# Patient Record
Sex: FEMALE | Race: WHITE
[De-identification: names, ages, dates, MRNs, and addresses within clinical notes are randomized per-mention and may not be internally consistent; named-entity substitution may affect disease eponyms.]

---

## 2017-12-08 ENCOUNTER — HOSPITAL ENCOUNTER (EMERGENCY)
Dept: HOSPITAL 76 - ED | Age: 82
Discharge: HOME | End: 2017-12-08
Payer: MEDICARE

## 2017-12-08 VITALS — DIASTOLIC BLOOD PRESSURE: 94 MMHG | SYSTOLIC BLOOD PRESSURE: 121 MMHG

## 2017-12-08 DIAGNOSIS — S42.294A: Primary | ICD-10-CM

## 2017-12-08 DIAGNOSIS — Z79.82: ICD-10-CM
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
## 2017-12-08 DIAGNOSIS — Z87.891: ICD-10-CM

## 2017-12-08 PROCEDURE — 99284 EMERGENCY DEPT VISIT MOD MDM: CPT

## 2017-12-08 PROCEDURE — 99283 EMERGENCY DEPT VISIT LOW MDM: CPT

## 2017-12-08 PROCEDURE — 73060 X-RAY EXAM OF HUMERUS: CPT

## 2017-12-08 NOTE — XRAY REPORT
EXAM:

RIGHT HUMERUS RADIOGRAPHY

 

EXAM DATE: 12/8/2017 05:51 PM.

 

CLINICAL HISTORY: Fell, pain.

 

COMPARISON: None.

 

TECHNIQUE: 4 views.

 

FINDINGS: 

Bones: Acute comminuted nondisplaced fracture involving the right humeral head and neck with maximum 
3 mm distraction of the fracture fragments, without angulation.

 

Joints: Mild inferior subluxation of the right humeral head relative to the glenoid fossa consistent 
with a presumed large right hemarthrosis.

Grossly normal right elbow.

 

Soft Tissues: Edema at the fracture site. Prior right breast surgery.

 

IMPRESSION: 

Right humeral head and neck fracture.

 

[facility]

Referring Provider Line: [phone]

 

SITE ID: [id number]

## 2017-12-08 NOTE — XRAY PRELIMINARY REPORT
Accession: [id number]

Exam: XR HUMERUS RT

 

IMPRESSION: Right humeral head and neck fracture.

 

[facility]

 

SITE ID: [id number]

## 2017-12-08 NOTE — ED PHYSICIAN DOCUMENTATION
PD HPI UPPER EXT INJURY





- Stated complaint


Stated Complaint: R SHOULDER PX/GLF





- Chief complaint


Chief Complaint: Ext Problem





- History obtained from


History obtained from: Patient, Family





- History of Present Illness


Location: Other (85-year-old woman visiting from [place], she will be here 

for about 2 months.  This morning getting on the before she got on the plane 

she fell and injured her right shoulder.  It was an isolated injury.  She 

actually flew up here from [place] anyway where she will be visiting for 2 

months.)





Review of Systems


Constitutional: denies: Fever, Chills


Musculoskeletal: denies: Neck pain, Back pain, Pain with weight bearing


Neurologic: denies: Headache, Head injury, LOC





PD PAST MEDICAL HISTORY





- Past Medical History


Past Medical History: Yes





- Past Surgical History


Past Surgical History: Yes


Ortho: Knee replacement


/GYN: Other





- Present Medications


Home Medications: 


 Ambulatory Orders











 Medication  Instructions  Recorded  Confirmed


 


Anastrozole 5 mg PO DAILY 12/08/17 12/08/17


 


Aspirin 315 mg PO DAILY 12/08/17 12/08/17


 


Furosemide [Lasix] 40 mg PO DAILY 12/08/17 12/08/17


 


Levothyroxine [Synthroid] 50 mcg PO DAILY 12/08/17 12/08/17


 


Metoprolol Succinate 50 mg PO DAILY 12/08/17 12/08/17


 


Oxycodone HCl/Acetaminophen 1 - 2 tab PO Q4H PRN #15 tablet 12/08/17 





[Percocet 5-325 mg Tablet]   


 


Paroxetine HCl [Paxil] 20 mg PO DAILY 12/08/17 12/08/17


 


Potassium Chloride 40 meq PO DAILY 12/08/17 12/08/17














- Allergies


Allergies/Adverse Reactions: 


 Allergies











Allergy/AdvReac Type Severity Reaction Status Date / Time


 


No Known Drug Allergies Allergy   Verified 12/08/17 17:19














- Social History


Does the pt smoke?: No


Smoking Status: Former smoker


Does the pt drink ETOH?: Yes


Does the pt have substance abuse?: No





- Immunizations


Immunizations are current?: Yes





PD ED PE NORMAL





- Vitals


Vital signs reviewed: Yes





- General


General: Alert and oriented X 3, No acute distress





- HEENT


HEENT: PERRL, EOMI





- Neck


Neck: Supple, no meningeal sign, No bony TTP





- Extremities


Extremities: Other (Tender to the proximal humerus without deformity.  Mild 

tenderness to the elbow, good radial pulse and sensation throughout the hand, 

the remainder of the extremities are nontender.)





- Neuro


Neuro: Alert and oriented X 3, Normal speech





- Psych


Psych: Normal mood, Normal affect





Results





- Vitals


Vitals: 


 Vital Signs - 24 hr











  12/08/17 12/08/17





  17:14 18:20


 


Temperature 36.3 C L 36.4 C L


 


Heart Rate 78 82


 


Respiratory 19 





Rate  


 


Blood Pressure 132/79 H 121/94 H


 


O2 Saturation 97 








 Oxygen











O2 Source                      Room air

















- Rads (name of study)


  ** R humerus


Radiology: EMP read contemporaneously (Right humeral head and neck fracture 

with a large joint effusion)





PD MEDICAL DECISION MAKING





- ED course


ED course: 





She already had a sling from a clavicle injury before.





Departure





- Departure


Disposition: 01 Home, Self Care


Clinical Impression: 


Closed fracture of right proximal humerus


Qualifiers:


 Encounter type: initial encounter Fracture morphology: other fracture Fracture 

alignment: nondisplaced Qualified Code(s): S42.294A - Other nondisplaced 

fracture of upper end of right humerus, initial encounter for closed fracture





Condition: Good


Record reviewed to determine appropriate education?: Yes


Instructions:  ED Fx Upper Ext


Follow-Up: 


[facility] [Provider Group]


Prescriptions: 


Oxycodone HCl/Acetaminophen [Percocet 5-325 mg Tablet] 1 - 2 tab PO Q4H PRN #15 

tablet


 PRN Reason: Pain


Comments: 


Call the orthopedics Monday office on Monday for an appointment.  Return if 

worse.  Keep it in the sling until you follow-up.





Do not drink or drive while taking narcotic pain medication.


Note that many narcotic pain relievers also contain Tylenol/acetaminophen.  

Please ensure that your total dose of acetaminophen from all sources does not 

exceed 3 g (3000 mg) per day.


You may get constipated while on this medication.  Take a stool softener such 

as Colace twice a day while you are on it.  Also add an over-the-counter 

laxative such as senna or MiraLAX on any day that you do not have a bowel 

movement.


If you received a narcotic pain medication or sedative while in the emergency 

department, do not drive for the next 24 hours.





Your blood pressure was elevated today on check into the emergency department.  

This does not mean that you have hypertension, it is a common phenomenon to 

come to the emergency department and have elevated blood pressure.  I recommend 

that you see your primary care physician within the week to have it rechecked 

when you are feeling better.


Discharge Date/Time: 12/08/17 18:20

## 2017-12-12 ENCOUNTER — HOSPITAL ENCOUNTER (EMERGENCY)
Dept: HOSPITAL 76 - ED | Age: 82
Discharge: HOME | End: 2017-12-12
Payer: MEDICARE

## 2017-12-12 VITALS — DIASTOLIC BLOOD PRESSURE: 82 MMHG | SYSTOLIC BLOOD PRESSURE: 147 MMHG

## 2017-12-12 DIAGNOSIS — Z79.82: ICD-10-CM
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
## 2017-12-12 DIAGNOSIS — Z96.659: ICD-10-CM

## 2017-12-12 DIAGNOSIS — S42.301A: Primary | ICD-10-CM

## 2017-12-12 PROCEDURE — 99283 EMERGENCY DEPT VISIT LOW MDM: CPT

## 2017-12-12 NOTE — ED PHYSICIAN DOCUMENTATION
History of Present Illness





- Stated complaint


Stated Complaint: RT SHOULDER PX





- Chief complaint


Chief Complaint: Ext Problem





- History obtained from


History obtained from: Patient (pt was here on friday after a fall and was dx 

with a right humerus fracture.  was given 15 percocet. she was to have a ortho 

follow up today but it had to be canceled because the doctor was sick.  she has 

runout of her pain meds.  She has a follow up tomorrow withthe ortho provider.)





Review of Systems


Constitutional: denies: Fever


Cardiac: denies: Chest pain / pressure


Respiratory: denies: Dyspnea


GI: denies: Constipation


Skin: reports: Other (bruising to the right shoulder)


Musculoskeletal: reports: Other (pain to the right shoulder)





PD PAST MEDICAL HISTORY





- Past Surgical History


Past Surgical History: Yes


Ortho: Knee replacement


/GYN: Other





- Present Medications


Home Medications: 


 Ambulatory Orders











 Medication  Instructions  Recorded  Confirmed


 


Anastrozole 5 mg PO DAILY 12/08/17 12/12/17


 


Aspirin 315 mg PO DAILY 12/08/17 12/12/17


 


Furosemide [Lasix] 40 mg PO DAILY 12/08/17 12/12/17


 


Levothyroxine [Synthroid] 50 mcg PO DAILY 12/08/17 12/12/17


 


Metoprolol Succinate 50 mg PO DAILY 12/08/17 12/12/17


 


Oxycodone HCl/Acetaminophen 1 - 2 tab PO Q4H PRN #15 tablet 12/08/17 12/12/17





[Percocet 5-325 mg Tablet]   


 


Paroxetine HCl [Paxil] 20 mg PO DAILY 12/08/17 12/12/17


 


Potassium Chloride 40 meq PO DAILY 12/08/17 12/12/17


 


oxyCODONE/ACET 5/325 [Percocet 5 1 - 2 each PO Q6H PRN #30 tablet 12/12/17 





mg/325 mg]   














- Allergies


Allergies/Adverse Reactions: 


 Allergies











Allergy/AdvReac Type Severity Reaction Status Date / Time


 


No Known Drug Allergies Allergy   Verified 12/08/17 17:19














- Social History


Does the pt smoke?: No


Smoking Status: Never smoker


Does the pt drink ETOH?: Yes


Does the pt have substance abuse?: No





- Immunizations


Immunizations are current?: Yes





PD ED PE NORMAL





- Vitals


Vital signs reviewed: Yes





- General


General: Alert and oriented X 3, Well developed/nourished





- Cardiac


Cardiac: Strong equal pulses (radial )





- Derm


Derm: Other (brusisin to the right upper humerus.  )





- Extremities


Extremities: Other (pt in a sling and TTP and decreased movement to the right 

shoulder)





- Neuro


Neuro: Other (sensation intact to the right UE)





- Psych


Psych: Normal affect





Results





- Vitals


Vitals: 





 Vital Signs - 24 hr











  12/12/17





  13:22


 


Temperature 36.5 C


 


Heart Rate 87


 


Respiratory 20





Rate 


 


Blood Pressure 147/82 H


 


O2 Saturation 95








 Oxygen











O2 Source                      Room air

















PD MEDICAL DECISION MAKING





- ED course


Complexity details: d/w patient


ED course: 





Pt with known right shoulder injury and is out of her pain meds.  she has a 

follow up with ortho tomorrow.  is here only for a refill of her medications.  

She has no constipation problems,.  





Departure





- Departure


Disposition: 01 Home, Self Care


Clinical Impression: 


 Humeral fracture





Condition: Good


Follow-Up: 


ortho, tomorrow [Other]


Prescriptions: 


oxyCODONE/ACET 5/325 [Percocet 5 mg/325 mg] 1 - 2 each PO Q6H PRN #30 tablet


 PRN Reason: Pain


Comments: 


keep your appointment for tomorrow.  Return to the ER for any new or worsening 

symptoms.

## 2017-12-19 ENCOUNTER — HOSPITAL ENCOUNTER (EMERGENCY)
Dept: HOSPITAL 76 - ED | Age: 82
Discharge: HOME | End: 2017-12-19
Payer: MEDICARE

## 2017-12-19 VITALS — SYSTOLIC BLOOD PRESSURE: 123 MMHG | DIASTOLIC BLOOD PRESSURE: 62 MMHG

## 2017-12-19 DIAGNOSIS — Z86.711: ICD-10-CM

## 2017-12-19 DIAGNOSIS — S32.040A: Primary | ICD-10-CM

## 2017-12-19 DIAGNOSIS — D64.9: ICD-10-CM

## 2017-12-19 DIAGNOSIS — E87.6: ICD-10-CM

## 2017-12-19 DIAGNOSIS — Z79.82: ICD-10-CM

## 2017-12-19 DIAGNOSIS — I48.91: ICD-10-CM

## 2017-12-19 DIAGNOSIS — Z85.3: ICD-10-CM

## 2017-12-19 DIAGNOSIS — R60.9: ICD-10-CM
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
## 2017-12-19 LAB
ANION GAP SERPL CALCULATED.4IONS-SCNC: 14 MMOL/L (ref 6–13)
BASOPHILS NFR BLD AUTO: 0.1 10^3/UL (ref 0–0.1)
BASOPHILS NFR BLD AUTO: 1.2 %
BUN SERPL-MCNC: 23 MG/DL (ref 6–20)
CALCIUM UR-MCNC: 9.6 MG/DL (ref 8.5–10.3)
CHLORIDE SERPL-SCNC: 89 MMOL/L (ref 101–111)
CO2 SERPL-SCNC: 34 MMOL/L (ref 21–32)
CREAT SERPLBLD-SCNC: 0.9 MG/DL (ref 0.4–1)
CUL URINE ADD CHARGE: (no result)
EOSINOPHIL # BLD AUTO: 0.4 10^3/UL (ref 0–0.7)
EOSINOPHIL NFR BLD AUTO: 4.5 %
ERYTHROCYTE [DISTWIDTH] IN BLOOD BY AUTOMATED COUNT: 13.9 % (ref 12–15)
GFRSERPLBLD MDRD-ARVRAT: 60 ML/MIN/{1.73_M2} (ref 89–?)
GLUCOSE SERPL-MCNC: 141 MG/DL (ref 70–100)
HCT VFR BLD AUTO: 33.4 % (ref 37–47)
HGB UR QL STRIP: 11.3 G/DL (ref 12–16)
LYMPHOCYTES # SPEC AUTO: 0.6 10^3/UL (ref 1.5–3.5)
LYMPHOCYTES NFR BLD AUTO: 7.8 %
MCH RBC QN AUTO: 28.3 PG (ref 27–31)
MCHC RBC AUTO-ENTMCNC: 33.9 G/DL (ref 32–36)
MCV RBC AUTO: 83.4 FL (ref 81–99)
MONOCYTES # BLD AUTO: 0.5 10^3/UL (ref 0–1)
MONOCYTES NFR BLD AUTO: 5.6 %
NEUTROPHILS # BLD AUTO: 6.8 10^3/UL (ref 1.5–6.6)
NEUTROPHILS # SNV AUTO: 8.4 X10^3/UL (ref 4.8–10.8)
NEUTROPHILS NFR BLD AUTO: 80.9 %
NRBC # BLD AUTO: 0 /100WBC
PDW BLD AUTO: 6.9 FL (ref 7.9–10.8)
PH UR STRIP.AUTO: 7 PH (ref 5–7.5)
POTASSIUM SERPL-SCNC: 3 MMOL/L (ref 3.5–5)
RBC MAR: 4.01 10^6/UL (ref 4.2–5.4)
SODIUM SERPLBLD-SCNC: 137 MMOL/L (ref 135–145)
SP GR UR STRIP.AUTO: 1.01 (ref 1–1.03)
UA CHARGE (STRIP ONLY): (no result)
UA W/ MICROSCOPIC CHARGE: YES
UR CULTURE IF IND: (no result)
UROBILINOGEN UR STRIP.AUTO-MCNC: NEGATIVE MG/DL
WBC # BLD: 8.4 X10^3/UL
WBC # UR MANUAL: (no result) /HPF (ref 0–5)

## 2017-12-19 PROCEDURE — 72100 X-RAY EXAM L-S SPINE 2/3 VWS: CPT

## 2017-12-19 PROCEDURE — 99283 EMERGENCY DEPT VISIT LOW MDM: CPT

## 2017-12-19 PROCEDURE — 85025 COMPLETE CBC W/AUTO DIFF WBC: CPT

## 2017-12-19 PROCEDURE — 83880 ASSAY OF NATRIURETIC PEPTIDE: CPT

## 2017-12-19 PROCEDURE — 84484 ASSAY OF TROPONIN QUANT: CPT

## 2017-12-19 PROCEDURE — 81001 URINALYSIS AUTO W/SCOPE: CPT

## 2017-12-19 PROCEDURE — 71010: CPT

## 2017-12-19 PROCEDURE — 72170 X-RAY EXAM OF PELVIS: CPT

## 2017-12-19 PROCEDURE — 80048 BASIC METABOLIC PNL TOTAL CA: CPT

## 2017-12-19 PROCEDURE — 81003 URINALYSIS AUTO W/O SCOPE: CPT

## 2017-12-19 PROCEDURE — 93970 EXTREMITY STUDY: CPT

## 2017-12-19 PROCEDURE — 36415 COLL VENOUS BLD VENIPUNCTURE: CPT

## 2017-12-19 PROCEDURE — 87086 URINE CULTURE/COLONY COUNT: CPT

## 2017-12-19 NOTE — XRAY REPORT
EXAM:

PELVIS RADIOGRAPHY

 

EXAM DATE: 12/19/2017 11:29 AM.

 

CLINICAL HISTORY: Fall. Posterior pelvis pain.

 

COMPARISON: None.

 

TECHNIQUE: AP view.

 

FINDINGS: 

Bones: No fracture or bone lesion is demonstrated.

 

Joints: No significant osteoarthritis in the visualized hips. There is significant osteitis pubis wit
h sclerosis and irregular joint margin. Mild degenerative changes in the sacroiliac joints. No sublux
ation. Degenerative changes in the lower lumbar spine.

 

Soft Tissues: Normal. No soft tissue swelling.

 

IMPRESSION: 

1. No fracture is demonstrated. If there is persistent pain, MRI or CT may be considered to rule out 
occult fracture, especially in the sacrum.

2. There is significant osteitis pubis with sclerosis and irregular joint margin.

3. Degenerative changes in the lower lumbar spine.

 

 

[facility]

Referring Provider Line: [phone]

 

SITE ID: [id number]

## 2017-12-19 NOTE — XRAY REPORT
EXAM: 

CHEST RADIOGRAPHY

 

EXAM DATE: 12/19/2017 11:29 AM.

 

CLINICAL HISTORY: SOA. Leg edema.

 

COMPARISON: None.

 

TECHNIQUE: AP view.

 

FINDINGS:

Lungs/Pleura: No focal opacities evident. No pleural effusion. No pneumothorax. No vascular congestio
n.

 

Mediastinum: Mild cardiomegaly. No significant mediastinal or hilar enlargement.

 

Other: Minimally displaced right humeral surgical neck fracture, already known from humeral radiograp
hy 12/8/2017.

 

IMPRESSION: 

1. No acute abnormality in the lungs.

2. Mild cardiomegaly. No vascular congestion to suggest congestive heart failure.

3. Minimally displaced right humeral surgical neck fracture, already known from humeral radiography 1
2/8/2017.

 

 

[facility]

Referring Provider Line: [phone]

 

SITE ID: [id number]

## 2017-12-19 NOTE — XRAY REPORT
EXAM:

LUMBOSACRAL SPINE RADIOGRAPHY

 

EXAM DATE: 12/19/2017 11:29 AM.

 

CLINICAL HISTORY: fell one week ago. Low back pain.

 

COMPARISONS: None.

 

TECHNIQUE: 2 views.

 

FINDINGS:

Alignment: Minimal levoscoliosis centered at L1-L2. L3-L4 grade 1, 3-4 mm anterolisthesis.

 

Bones: Five non-rib-bearing lumbar vertebral bodies are present. Superior endplate compression fractu
re of L4 with anterior wedging and minimal retropulsion, age indeterminate.

 

Disks: Multilevel lower lumbar degenerative changes. Severe loss of disk height L4-L5 and L5-S1.

 

Facets: Mild facet arthropathy L3-L4 through L5-S1.

 

Sacroiliac Joints: Mild degenerative changes.

 

Soft Tissues: IVC filter with apex at L1-L2 level. The visualized bowel gas pattern is normal.

 

IMPRESSION: 

1. Superior endplate compression fracture of L4 with anterior wedging and minimal retropulsion, age i
ndeterminate.

2. Multilevel lower lumbar degenerative changes. Severe loss of disk height L4-L5 and L5-S1.

3. Minimal levoscoliosis centered at L1-L2. L3-L4 grade 1, 3-4 mm anterolisthesis.

4. Mild facet arthropathy L3-L4 through L5-S1.

5. Please see separate report of pelvis radiography.

 

[facility]

Referring Provider Line: [phone]

 

SITE ID: [id number]

## 2017-12-19 NOTE — ULTRASOUND REPORT
EXAM:

BILATERAL LOWER EXTREMITY VENOUS ULTRASOUND

 

EXAM DATE: 12/19/2017 12:08 PM.

 

CLINICAL HISTORY: Bilateral lower extremity edema, history of  DVT PE, not on Coumadin, travel.

 

COMPARISON: None.

 

TECHNIQUE: Real-time sonographic vascular imaging was performed by the sonographer through the lower 
extremities utilizing both color-flow and Doppler spectral analysis. Multiple representative static i
mages were saved for review.

 

FINDINGS: 

Right:

Common Femoral Vein (CFV): Normal.

CFV-GSV Junction: Normal.

Profunda Femoral Vein (PFV): Normal.

Femoral Vein (FV) Prox: Normal.

Femoral Vein (FV) Mid: Normal.

Femoral Vein (FV) Dist: Normal.

Popliteal Vein: Normal.

Posterior Tibial Veins: Normal.

Peroneal Veins: Normal.

 

Left:

Common Femoral Vein (CFV): Normal.

CFV-GSV Junction: Normal.

Profunda Femoral Vein (PFV): Normal.

Femoral Vein (FV) Prox: Normal.

Femoral Vein (FV) Mid: Normal.

Femoral Vein (FV) Dist: Normal.

Popliteal Vein: Normal.

Posterior Tibial Veins: Normal.

Peroneal Veins: Normal.

 

Other: None.

 

IMPRESSION: No evidence for deep venous thrombosis bilaterally.

 

[facility]

Referring Provider Line: [phone]

 

SITE ID: [id number]

## 2017-12-19 NOTE — ED PHYSICIAN DOCUMENTATION
History of Present Illness





- Stated complaint


Stated Complaint: BACK PX





- Chief complaint


Chief Complaint: Back Pain





- Additonal information


Additional information: 





hx from pt and family


85 female from out of town


pmhx a fib, PE with IVC filter no coumadin, breast cancer previously txed


12/8 she slipped on tile and landed hard on her right side


no head or neck injury


mostly shoulder pain


she wrapped her arm up, got in her car, drove tho the airport, took a several 

hr flight and came to be with her family on [place]


brought to the ER that day and xray showed a shoulder fx


she also has had some low back pain since the fall, primarily focused over R SI 

jt region radiating across posterior pelvis and up L spine - no numbness or 

weakness, no incontinence


also she has abi LE edema which is unuusal for her - she takes lasix but does 

not believe she has CHF, has not eaten much but perhaps some increased salt 

recently (ham and popcorn) no CP or SOA








Review of Systems


Constitutional: denies: Fever, Chills


Cardiac: denies: Chest pain / pressure, Palpitations


Respiratory: denies: Dyspnea (no change from her baseline - uses home O2)


GI: reports: Abdominal Pain (when she tried to walk related to her back injury)


: denies: Incontinent


Musculoskeletal: reports: Back pain, Joint pain (R shoulder), Extremity 

swelling (abi)


Endocrine: denies: Easy bruising / bleeding


Immunocompromised: denies: Immunocompromised





PD PAST MEDICAL HISTORY





- Past Medical History


Past Medical History: Yes





- Past Surgical History


Past Surgical History: Yes


Ortho: Knee replacement


/GYN: Other





- Present Medications


Home Medications: 


 Ambulatory Orders











 Medication  Instructions  Recorded  Confirmed


 


Anastrozole 5 mg PO DAILY 12/08/17 12/12/17


 


Aspirin 315 mg PO DAILY 12/08/17 12/12/17


 


Furosemide [Lasix] 40 mg PO DAILY 12/08/17 12/12/17


 


Levothyroxine [Synthroid] 50 mcg PO DAILY 12/08/17 12/12/17


 


Metoprolol Succinate 50 mg PO DAILY 12/08/17 12/12/17


 


Oxycodone HCl/Acetaminophen 1 - 2 tab PO Q4H PRN #15 tablet 12/08/17 12/12/17





[Percocet 5-325 mg Tablet]   


 


Paroxetine HCl [Paxil] 20 mg PO DAILY 12/08/17 12/12/17


 


Potassium Chloride 40 meq PO DAILY 12/08/17 12/12/17


 


oxyCODONE/ACET 5/325 [Percocet 5 1 - 2 each PO Q6H PRN #30 tablet 12/12/17 





mg/325 mg]   


 


Lidocaine Patch 5% [Lidoderm Patch] 1 each TOP DAILY PRN #10 patch 12/19/17 


 


oxyCODONE [Roxicodone] 5 mg PO Q6H PRN #10 tablet 12/19/17 














- Allergies


Allergies/Adverse Reactions: 


 Allergies











Allergy/AdvReac Type Severity Reaction Status Date / Time


 


No Known Drug Allergies Allergy   Verified 12/08/17 17:19














- Social History


Does the pt smoke?: No


Smoking Status: Never smoker


Does the pt drink ETOH?: Yes


Does the pt have substance abuse?: No





- Immunizations


Immunizations are current?: Yes





PD ED PE NORMAL





- Vitals


Vital signs reviewed: Yes





- General


General: Alert and oriented X 3





- HEENT


HEENT: Atraumatic, PERRL





- Neck


Neck: No bony TTP





- Cardiac


Cardiac: RRR





- Respiratory


Respiratory: Clear bilaterally





- Abdomen


Abdomen: Soft, Non tender, Other (resolving yeast infection in pannus folds)





- Back


Back: Other (TTP across lower back near SI jt and to lower L spine, no bruising)





- Extremities


Extremities: Other (abi LE edema symm, no erythema or discoloration, hips able 

to be ranged though causes back to hurt)





- Neuro


Neuro: Alert and oriented X 3, No motor deficit, No sensory deficit, Other (hip 

flex knee ext foot dorsi plantar great toe 5/5, no clonus, neg SLR, denies 

saddle anesthesia)





Results





- Vitals


Vitals: 


 Vital Signs - 24 hr











  12/19/17 12/19/17 12/19/17





  08:59 10:09 12:32


 


Temperature 36.6 C 36.7 C 36.4 C L


 


Heart Rate 79 84 78


 


Respiratory 20 16 16





Rate   


 


Blood Pressure 146/74 H 125/61 142/89 H


 


O2 Saturation 95 96 97








 Oxygen











O2 Source                      Nasal cannula


 


Oxygen Flow Rate               2

















- Labs


Labs: 


 Laboratory Tests











  12/19/17 12/19/17 12/19/17





  10:20 10:20 10:20


 


WBC  8.4  


 


RBC  4.01 L  


 


Hgb  11.3 L  


 


Hct  33.4 L  


 


MCV  83.4  


 


MCH  28.3  


 


MCHC  33.9  


 


RDW  13.9  


 


Plt Count  313  


 


MPV  6.9 L  


 


Neut #  6.8 H  


 


Lymph #  0.6 L  


 


Mono #  0.5  


 


Eos #  0.4  


 


Baso #  0.1  


 


Absolute Nucleated RBC  0.00  


 


Nucleated RBC %  0.0  


 


Sodium   137 


 


Potassium   3.0 L 


 


Chloride   89 L 


 


Carbon Dioxide   34 H 


 


Anion Gap   14.0 H 


 


BUN   23 H 


 


Creatinine   0.9 


 


Estimated GFR (MDRD)   60 L 


 


Glucose   141 H 


 


Calcium   9.6 


 


Troponin I    < 0.04


 


B-Natriuretic Peptide   


 


Urine Color   


 


Urine Clarity   


 


Urine pH   


 


Ur Specific Gravity   


 


Urine Protein   


 


Urine Glucose (UA)   


 


Urine Ketones   


 


Urine Occult Blood   


 


Urine Nitrite   


 


Urine Bilirubin   


 


Urine Urobilinogen   


 


Ur Leukocyte Esterase   


 


Urine RBC   


 


Urine WBC   


 


Ur Squamous Epith Cells   


 


Urine Bacteria   


 


Ur Microscopic Review   


 


Urine Culture Comments   














  12/19/17 12/19/17





  10:20 12:20


 


WBC  


 


RBC  


 


Hgb  


 


Hct  


 


MCV  


 


MCH  


 


MCHC  


 


RDW  


 


Plt Count  


 


MPV  


 


Neut #  


 


Lymph #  


 


Mono #  


 


Eos #  


 


Baso #  


 


Absolute Nucleated RBC  


 


Nucleated RBC %  


 


Sodium  


 


Potassium  


 


Chloride  


 


Carbon Dioxide  


 


Anion Gap  


 


BUN  


 


Creatinine  


 


Estimated GFR (MDRD)  


 


Glucose  


 


Calcium  


 


Troponin I  


 


B-Natriuretic Peptide  200 H 


 


Urine Color   YELLOW


 


Urine Clarity   CLEAR


 


Urine pH   7.0


 


Ur Specific Gravity   1.010


 


Urine Protein   NEGATIVE


 


Urine Glucose (UA)   NEGATIVE


 


Urine Ketones   NEGATIVE


 


Urine Occult Blood   SMALL H


 


Urine Nitrite   NEGATIVE


 


Urine Bilirubin   NEGATIVE


 


Urine Urobilinogen   0.2 (NORMAL)


 


Ur Leukocyte Esterase   NEGATIVE


 


Urine RBC   0-5


 


Urine WBC   0-3


 


Ur Squamous Epith Cells   FEW Squamous


 


Urine Bacteria   Rare


 


Ur Microscopic Review   INDICATED


 


Urine Culture Comments   NOT INDICATED














- Rads (name of study)


  ** pelvis xray


Radiology: See rad report (no fracture seen, consider advaced imaging of 

concern persists (think L4 comp fx explains the pain))





  ** L spine


Radiology: See rad report (ant sup L4 wdge comp fx with minimal retropulsion, 

degen changes)





  ** CXR


Radiology: See rad report (cardiomegaly, no CHF, no acute, known humerus fx)





  ** abi LE dopplers


Radiology: See rad report (neg)





Departure





- Departure


Disposition: 01 Home, Self Care


Clinical Impression: 


 Hypokalemia





Lumbar compression fracture


Qualifiers:


 Encounter type: initial encounter Lumbar vertebra fracture level: L4 Fracture 

type: closed Qualified Code(s): S32.040A - Wedge compression fracture of fourth 

lumbar vertebra, initial encounter for closed fracture





Edema


Qualifiers:


 Edema type: unspecified Qualified Code(s): R60.9 - Edema, unspecified





Anemia


Qualifiers:


 Anemia type: unspecified type Qualified Code(s): D64.9 - Anemia, unspecified





Condition: Good


Instructions:  ED Fx Comp Vertebral, ED Edema Legs Bilateral, ED Potassium 

Deficiency


Prescriptions: 


Lidocaine Patch 5% [Lidoderm Patch] 1 each TOP DAILY PRN #10 patch


 PRN Reason: Pain


oxyCODONE [Roxicodone] 5 mg PO Q6H PRN #10 tablet


 PRN Reason: Severe Pain


Comments: 


The xrays show you have a compression fracture of lumbar spine # 4.


This not need surgery and will heal on its own.


Recommend lidocaine patches and tylenol for moderate pain. Can take oxycodone 

if needed for severe pain but try to limit use of narcotics.


The blood work and xray do not suggest CHF as the cause of swelling. And the 

ultrasound of your legs did not show blood clots. The swelling may be due to 

increased salt intake and decreased activity due to your injuries. Recommend 

wearing support hoes, elevating your legs whenever possible, and minimizing 

salt intake.


Your labs were fine otherwise except for mild anemia and slightly low potassium 

which is likely due to taking extra lasix for the swelling. Please eat foods 

with plenty of potassium and have your PMD recheck the levels in a week or two

## 2020-01-16 ENCOUNTER — HOSPITAL ENCOUNTER (EMERGENCY)
Dept: HOSPITAL 76 - ED | Age: 85
Discharge: HOME | End: 2020-01-16
Payer: MEDICARE

## 2020-01-16 VITALS — DIASTOLIC BLOOD PRESSURE: 90 MMHG | SYSTOLIC BLOOD PRESSURE: 135 MMHG

## 2020-01-16 DIAGNOSIS — J20.9: ICD-10-CM

## 2020-01-16 DIAGNOSIS — Z99.81: ICD-10-CM

## 2020-01-16 DIAGNOSIS — Z79.82: ICD-10-CM

## 2020-01-16 DIAGNOSIS — J44.0: Primary | ICD-10-CM

## 2020-01-16 PROCEDURE — 94664 DEMO&/EVAL PT USE INHALER: CPT

## 2020-01-16 PROCEDURE — 71046 X-RAY EXAM CHEST 2 VIEWS: CPT

## 2020-01-16 PROCEDURE — 94640 AIRWAY INHALATION TREATMENT: CPT

## 2020-01-16 PROCEDURE — 99284 EMERGENCY DEPT VISIT MOD MDM: CPT

## 2020-01-16 NOTE — ED PHYSICIAN DOCUMENTATION
PD HPI URI





- Stated complaint


Stated Complaint: COUGH





- Chief complaint


Chief Complaint: Resp





- History obtained from


History obtained from: Patient





- History of Present Illness


Timing - onset: How many weeks ago (she has had cough for few weeks since 

[place] (nursing notes day Thanksgiving, but patient said [place] to me). 

seen by PMD 2 weeks ago and Dx with viral. She has had continued cough and 

wheezing/dyspnea. Here visiting for couple of weeks and feeling worse.)


Timing duration: Weeks


Timing details: Gradual onset, Still present, Waxing and waning


Associated symptoms: Productive cough, Dyspnea.  No: Fever, Nasal congestion, 

Sore throat, Hemoptysis, Chest pain, Bilateral edema


Contributing factors: COPD / asthma (on home oxygen).  No: Sick contact


Recently seen: Clinic (2 weeks ago)





Review of Systems


Constitutional: denies: Fever, Chills, Myalgias


Nose: denies: Rhinorrhea / runny nose, Congestion


Throat: denies: Sore throat


Cardiac: denies: Chest pain / pressure, Pedal edema


Respiratory: reports: Dyspnea, Cough


GI: denies: Nausea, Vomiting, Diarrhea, Bloody / black stool


Neurologic: reports: Generalized weakness.  denies: Near syncope





PD PAST MEDICAL HISTORY





- Past Medical History


Cardiovascular: None


Respiratory: COPD


Neuro: None


Endocrine/Autoimmune: None





- Past Surgical History


Past Surgical History: Yes


Ortho: Knee replacement


/GYN: Other





- Present Medications


Home Medications: 


                                Ambulatory Orders











 Medication  Instructions  Recorded  Confirmed


 


Anastrozole 5 mg PO DAILY 12/08/17 12/12/17


 


Aspirin 315 mg PO DAILY 12/08/17 12/12/17


 


Furosemide [Lasix] 40 mg PO DAILY 12/08/17 12/12/17


 


Levothyroxine [Synthroid] 50 mcg PO DAILY 12/08/17 12/12/17


 


Metoprolol Succinate 50 mg PO DAILY 12/08/17 12/12/17


 


Oxycodone HCl/Acetaminophen 1 - 2 tab PO Q4H PRN #15 tablet 12/08/17 12/12/17





[Percocet 5-325 mg Tablet]   


 


PARoxetine HCl [Paxil] 20 mg PO DAILY 12/08/17 12/12/17


 


Potassium Chloride 40 meq PO DAILY 12/08/17 12/12/17


 


oxyCODONE/ACET 5/325 [Percocet 5 1 - 2 each PO Q6H PRN #30 tablet 12/12/17 





mg/325 mg]   


 


Lidocaine Patch 5% [Lidoderm Patch] 1 each TOP DAILY PRN #10 patch 12/19/17 


 


oxyCODONE [Roxicodone] 5 mg PO Q6H PRN #10 tablet 12/19/17 


 


Albuterol Sulfate [Albuterol 2 puffs IH QID #1 hfa.aer.ad 01/16/20 





Sulfate Hfa]   


 


Benzonatate [Tessalon Perle] 100 mg PO TID PRN #25 capsule 01/16/20 


 


Doxycycline Monohydrate 100 mg PO BID #14 tablet 01/16/20 


 


dexAMETHasone [Decadron] 4 mg PO DAILY #5 tablet 01/16/20 














- Allergies


Allergies/Adverse Reactions: 


                                    Allergies











Allergy/AdvReac Type Severity Reaction Status Date / Time


 


No Known Drug Allergies Allergy   Verified 01/16/20 13:42














- Social History


Does the pt smoke?: No


Smoking Status: Never smoker


Does the pt drink ETOH?: Yes


Does the pt have substance abuse?: No





- Immunizations


Immunizations are current?: Yes





PD ED PE NORMAL





- Vitals


Vital signs reviewed: Yes





- General


General: Alert and oriented X 3, No acute distress, Well developed/nourished





- HEENT


HEENT: Pharynx benign





- Neck


Neck: Supple, no meningeal sign, No adenopathy





- Cardiac


Cardiac: RRR, No murmur





- Respiratory


Respiratory: No: Clear bilaterally (some wheezing diffusely. No coarse sounds. 

No fine crackles. )





- Abdomen


Abdomen: Soft, Non tender





- Back


Back: No CVA TTP





- Derm


Derm: Normal color, Warm and dry





- Extremities


Extremities: Normal ROM s pain, No edema, No calf tenderness / cord





- Neuro


Neuro: Alert and oriented X 3, No motor deficit, Normal speech





Results





- Vitals


Vitals: 


                               Vital Signs - 24 hr











  01/16/20 01/16/20 01/16/20





  13:42 17:26 17:35


 


Temperature 36.6 C  


 


Heart Rate 84 76 92


 


Respiratory 16 18 18





Rate   


 


Blood Pressure 156/95 H  135/90 H


 


O2 Saturation 98  96








                                     Oxygen











O2 Source                      Nasal cannula


 


Oxygen Flow Rate               2

















- Rads (name of study)


  ** chest xray


Radiology: Prelim report reviewed (old scarring right side; no acute 

infiltrates. ), See rad report





PD MEDICAL DECISION MAKING





- ED course


Complexity details: re-evaluated patient (improved with neb treatment. ), 

considered differential, d/w patient





Departure





- Departure


Disposition: 01 Home, Self Care


Clinical Impression: 


 Bronchitis





Condition: Stable


Record reviewed to determine appropriate education?: Yes


Instructions:  ED Upper Resp Infec Abx Tx


Prescriptions: 


Albuterol Sulfate [Albuterol Sulfate Hfa] 2 puffs IH QID #1 hfa.aer.ad


Benzonatate [Tessalon Perle] 100 mg PO TID PRN #25 capsule


 PRN Reason: Cough


dexAMETHasone [Decadron] 4 mg PO DAILY #5 tablet


Doxycycline Monohydrate 100 mg PO BID #14 tablet


Comments: 


Use the albuterol inhaler 2 puffs 4 times a day for the next 7 to 10 days and 

extra times if needed for wheezing and cough.  Tessalon if needed for cough 

suppression.





Decadron steroid for inflammation of the bronchials.  Take that daily for the 

next 5 days.  There is a possibility of bacterial infection with this so we will

 use an doxycycline antibiotic twice daily for a week.





Your chest x-ray was clear without any signs of pneumonia.


Discharge Date/Time: 01/16/20 17:37

## 2020-01-16 NOTE — XRAY REPORT
Reason:  cough

Procedure Date:  01/16/2020   

Accession Number:  [id number] / [id number]                    

Procedure:  XR  - Chest 2 View X-Ray CPT Code:  30145

 

***Final Report***

 

 

FULL RESULT:

 

 

EXAM:

CHEST RADIOGRAPHY

 

EXAM DATE: 1/16/2020 02:53 PM.

 

CLINICAL HISTORY: Cough.

 

COMPARISON: SHOULDER 2 VIEW RT 01/30/2018 9:03 AM.

SHOULDER 2 VIEW RT 01/09/2018 10:06 AM.

 

TECHNIQUE: 2 views.

 

FINDINGS:

Lungs/Pleura: Mild increased interstitial markings in the right chest. No 

focal discrete infiltrates.

 

Mediastinum: Borderline cardiomegaly.

 

Other: Bones are profoundly osteopenic. No acute fractures. Old healed 

proximal right surgical neck fracture.

IMPRESSION: Mild chronic interstitial markings right chest.

Bones are profoundly osteopenic, no acute fractures. Old healed right 

proximal surgical neck fracture.

 

[facility]